# Patient Record
Sex: FEMALE | Race: WHITE
[De-identification: names, ages, dates, MRNs, and addresses within clinical notes are randomized per-mention and may not be internally consistent; named-entity substitution may affect disease eponyms.]

---

## 2019-01-03 ENCOUNTER — HOSPITAL ENCOUNTER (EMERGENCY)
Dept: HOSPITAL 25 - ED | Age: 39
LOS: 1 days | Discharge: HOME | End: 2019-01-04
Payer: MEDICAID

## 2019-01-03 DIAGNOSIS — F17.200: ICD-10-CM

## 2019-01-03 DIAGNOSIS — B34.9: Primary | ICD-10-CM

## 2019-01-03 DIAGNOSIS — R91.8: ICD-10-CM

## 2019-01-03 DIAGNOSIS — R59.0: ICD-10-CM

## 2019-01-03 LAB
HCT VFR BLD AUTO: 42 % (ref 35–47)
HGB BLD-MCNC: 14 G/DL (ref 12–16)
MCH RBC QN AUTO: 33 PG (ref 27–31)
MCHC RBC AUTO-ENTMCNC: 34 G/DL (ref 31–36)
MCV RBC AUTO: 98 FL (ref 80–97)
PLATELET # BLD AUTO: 347 10^3/UL (ref 150–450)
RBC # BLD AUTO: 4.23 10^6/UL (ref 4–5.4)
WBC # BLD AUTO: 11 10^3/UL (ref 3.5–10.8)

## 2019-01-03 PROCEDURE — 86140 C-REACTIVE PROTEIN: CPT

## 2019-01-03 PROCEDURE — 80053 COMPREHEN METABOLIC PANEL: CPT

## 2019-01-03 PROCEDURE — 85025 COMPLETE CBC W/AUTO DIFF WBC: CPT

## 2019-01-03 PROCEDURE — 83605 ASSAY OF LACTIC ACID: CPT

## 2019-01-03 PROCEDURE — 36415 COLL VENOUS BLD VENIPUNCTURE: CPT

## 2019-01-03 PROCEDURE — 71260 CT THORAX DX C+: CPT

## 2019-01-03 PROCEDURE — 85060 BLOOD SMEAR INTERPRETATION: CPT

## 2019-01-03 PROCEDURE — 84702 CHORIONIC GONADOTROPIN TEST: CPT

## 2019-01-03 PROCEDURE — 99282 EMERGENCY DEPT VISIT SF MDM: CPT

## 2019-01-04 VITALS — DIASTOLIC BLOOD PRESSURE: 74 MMHG | SYSTOLIC BLOOD PRESSURE: 124 MMHG

## 2019-01-04 LAB
ALBUMIN SERPL BCG-MCNC: 4 G/DL (ref 3.2–5.2)
ALBUMIN/GLOB SERPL: 1.2 {RATIO} (ref 1–3)
ALP SERPL-CCNC: 57 U/L (ref 34–104)
ALT SERPL W P-5'-P-CCNC: 7 U/L (ref 7–52)
ANION GAP SERPL CALC-SCNC: 7 MMOL/L (ref 2–11)
AST SERPL-CCNC: 10 U/L (ref 13–39)
BASOPHILS # BLD AUTO: 0.1 10^3/UL (ref 0–0.2)
BASOPHILS # BLD: 0.2 10^3/UL (ref 0–0.2)
BUN SERPL-MCNC: 7 MG/DL (ref 6–24)
BUN/CREAT SERPL: 10.8 (ref 8–20)
CALCIUM SERPL-MCNC: 9.3 MG/DL (ref 8.6–10.3)
CHLORIDE SERPL-SCNC: 105 MMOL/L (ref 101–111)
EOSINOPHIL # BLD AUTO: 0 10^3/UL (ref 0–0.6)
GLOBULIN SER CALC-MCNC: 3.3 G/DL (ref 2–4)
GLUCOSE SERPL-MCNC: 109 MG/DL (ref 70–100)
HCG SERPL QL: < 0.6 MIU/ML
HCO3 SERPL-SCNC: 26 MMOL/L (ref 22–32)
LYMPHOCYTES # BLD AUTO: 1.1 10^3/UL (ref 1–4.8)
MONOCYTES # BLD AUTO: 0.5 10^3/UL (ref 0–0.8)
NEUTROPHILS # BLD AUTO: 9.2 10^3/UL (ref 1.5–7.7)
NEUTROPHILS # BLD: 9.6 10^3/UL (ref 1.5–7.7)
NRBC # BLD AUTO: 0 10^3/UL
POTASSIUM SERPL-SCNC: 4.1 MMOL/L (ref 3.5–5)
PROT SERPL-MCNC: 7.3 G/DL (ref 6.4–8.9)
SODIUM SERPL-SCNC: 138 MMOL/L (ref 135–145)
VARIANT LYMPHS # BLD MANUAL: 1 % (ref 0–6)

## 2019-01-04 NOTE — ED
Respiratory





- HPI Summary


HPI Summary: 





Patient with history of cough, low-grade fever, myalgia sent by urgent care to 

ED for further evaluation of concerning find on chest x-ray.  Patient states 

symptoms 3 days.  Denies HA, ear pain, nasal congestion, neck stiffness, sore 

throat, CP, SOB, N/V/D, abdominal pain, change in urine, change in BM.  Medical 

history is none.  Pack-a-day smoker.





- History of Current Complaint


Chief Complaint: EDUpperRespComplaint


Stated Complaint: COUGH/FEVER


Time Seen by Provider: 01/03/19 23:13


Hx Obtained From: Patient


Onset/Duration: Lasting Days


Timing: Constant


Initial Severity: Moderate


Current Severity: Moderate


Pain Intensity: 4


Character: Cough (Productive)


Sputum Amount: Small


Sputum Color: Yellow


Aggravating Factor(s): Nothing


Alleviating Factor(s): Nothing


Associated Signs and Symptoms: Fever





- Allergy/Home Medications


Allergies/Adverse Reactions: 


 Allergies











Allergy/AdvReac Type Severity Reaction Status Date / Time


 


No Known Allergies Allergy   Verified 01/03/19 21:01














PMH/Surg Hx/FS Hx/Imm Hx


Endocrine/Hematology History: 


   Denies: Hx Anticoagulant Therapy


Cardiovascular History: 


   Denies: Hx Cardiac Arrest


 History: 


   Denies: Hx Dialysis


Sensory History: 


   Denies: Hx Eye Prosthesis


Neurological History: 


   Denies: Hx Developmental Delay


Psychiatric History: 


   Denies: Hx Autism


Infectious Disease History: No


Infectious Disease History: 


   Denies: Traveled Outside the [place] in Last 30 Days





- Family History


Known Family History: Positive: Unknown





- Social History


Lives: With Family


Alcohol Use: Rare


Substance Use Type: Reports: None


Smoking Status (MU): Heavy Every Day Tobacco Smoker





Review of Systems


Positive: Fever


Eyes: Negative


ENT: Negative


Cardiovascular: Negative


Positive: Cough


Gastrointestinal: Negative


Genitourinary: Negative


Positive: Myalgia


Skin: Negative


Neurological: Negative


Psychological: Normal


All Other Systems Reviewed And Are Negative: Yes





Physical Exam


Triage Information Reviewed: Yes


Vital Signs On Initial Exam: 


 Initial Vitals











Temp Pulse Resp BP Pulse Ox


 


 100.5 F   112   20   114/71   99 


 


 01/03/19 20:56  01/03/19 20:56  01/03/19 20:56  01/03/19 20:56  01/03/19 20:56











Vital Signs Reviewed: Yes


Appearance: Positive: Well-Appearing


Skin: Positive: Warm


Head/Face: Positive: Normal Head/Face Inspection


Eyes: Positive: Normal


ENT: Positive: Normal ENT inspection


Neck: Positive: Supple


Respiratory/Lung Sounds: Positive: Clear to Auscultation


Cardiovascular: Positive: Normal


Pelvic Exam: Positive: External Exam Normal


Musculoskeletal: Positive: Normal


Neurological: Positive: Normal


Psychiatric: Positive: Normal


AVPU Assessment: Alert





- [place] Coma Scale


Best Eye Response: 4 - Spontaneous


Best Motor Response: 6 - Obeys Commands


Best Verbal Response: 5 - Oriented


Coma Scale Total: 15





Diagnostics





- Vital Signs


 Vital Signs











  Temp Pulse Resp BP Pulse Ox


 


 01/03/19 23:22   94    98


 


 01/03/19 23:20   106   115/79  98


 


 01/03/19 20:56  100.5 F  112  20  114/71  99














- Laboratory


Lab Results: 


 Lab Results











  01/03/19 01/03/19 01/03/19 Range/Units





  23:40 23:40 23:42 


 


WBC    11.0 H  (3.5-10.8)  10^3/ul


 


RBC    4.23  (4.00-5.40)  10^6/ul


 


Hgb    14.0  (12.0-16.0)  g/dl


 


Hct    42  (35-47)  %


 


MCV    98 H  (80-97)  fL


 


MCH    33 H  (27-31)  pg


 


MCHC    34  (31-36)  g/dl


 


RDW    14  (10.5-15)  %


 


Plt Count    347  (150-450)  10^3/ul


 


MPV    7.2 L  (7.4-10.4)  fL


 


Neut % (Auto)    Not Reportable  


 


Lymph % (Auto)    Not Reportable  


 


Mono % (Auto)    Not Reportable  


 


Eos % (Auto)    Not Reportable  


 


Baso % (Auto)    Not Reportable  


 


Absolute Neuts (auto)    9.2 H  (1.5-7.7)  10^3/ul


 


Absolute Lymphs (auto)    1.1  (1.0-4.8)  10^3/ul


 


Absolute Monos (auto)    0.5  (0-0.8)  10^3/ul


 


Absolute Eos (auto)    0  (0-0.6)  10^3/ul


 


Absolute Basos (auto)    0.1  (0-0.2)  10^3/ul


 


Absolute Nucleated RBC    0  10^3/ul


 


Immature Gran %    1  (0-9)  %


 


Neutrophils %    86  %


 


Band Neutrophils %    1  (0-8)  %


 


Lymphocytes %    7  %


 


Reactive Lymphs %    1  (0-6)  %


 


Monocytes %    3  %


 


Basophils %    2  %


 


Nucleated RBC %    Not Reportable  


 


Abs Neuts (Manual)    9.6 H  (1.5-7.7)  10^3/ul


 


Abs Lymphs (Manual)    0.9 L  (1.0-4.8)  10^3/ul


 


Abs Monocytes (Manual)    0.3  (0-0.8)  10^3/ul


 


Abs Basophils (Manual)    0.2  (0-0.2)  10^3/ul


 


Normal RBC Morphology    Not Reportable  


 


Macrocytosis    2+  


 


Hem Pathologist Commnt    Pending  


 


Sodium  138    (135-145)  mmol/L


 


Potassium  4.1    (3.5-5.0)  mmol/L


 


Chloride  105    (101-111)  mmol/L


 


Carbon Dioxide  26    (22-32)  mmol/L


 


Anion Gap  7    (2-11)  mmol/L


 


BUN  7    (6-24)  mg/dL


 


Creatinine  0.65    (0.51-0.95)  mg/dL


 


Est GFR ( Amer)  122.8    (>60)  


 


Est GFR (Non-Af Amer)  101.5    (>60)  


 


BUN/Creatinine Ratio  10.8    (8-20)  


 


Glucose  109 H    ()  mg/dL


 


Lactic Acid   0.5   (0.5-2.0)  mmol/L


 


Calcium  9.3    (8.6-10.3)  mg/dL


 


Total Bilirubin  0.50    (0.2-1.0)  mg/dL


 


AST  10 L    (13-39)  U/L


 


ALT  7    (7-52)  U/L


 


Alkaline Phosphatase  57    ()  U/L


 


C-Reactive Protein  16.72 H    (<8.01)  mg/L


 


Total Protein  7.3    (6.4-8.9)  g/dL


 


Albumin  4.0    (3.2-5.2)  g/dL


 


Globulin  3.3    (2-4)  g/dL


 


Albumin/Globulin Ratio  1.2    (1-3)  


 


Beta HCG, Quant  < 0.60    mIU/mL


 


Influenza A (Rapid)     (Negative)  


 


Influenza B (Rapid)     (Negative)  














  01/03/19 Range/Units





  23:43 


 


WBC   (3.5-10.8)  10^3/ul


 


RBC   (4.00-5.40)  10^6/ul


 


Hgb   (12.0-16.0)  g/dl


 


Hct   (35-47)  %


 


MCV   (80-97)  fL


 


MCH   (27-31)  pg


 


MCHC   (31-36)  g/dl


 


RDW   (10.5-15)  %


 


Plt Count   (150-450)  10^3/ul


 


MPV   (7.4-10.4)  fL


 


Neut % (Auto)   


 


Lymph % (Auto)   


 


Mono % (Auto)   


 


Eos % (Auto)   


 


Baso % (Auto)   


 


Absolute Neuts (auto)   (1.5-7.7)  10^3/ul


 


Absolute Lymphs (auto)   (1.0-4.8)  10^3/ul


 


Absolute Monos (auto)   (0-0.8)  10^3/ul


 


Absolute Eos (auto)   (0-0.6)  10^3/ul


 


Absolute Basos (auto)   (0-0.2)  10^3/ul


 


Absolute Nucleated RBC   10^3/ul


 


Immature Gran %   (0-9)  %


 


Neutrophils %   %


 


Band Neutrophils %   (0-8)  %


 


Lymphocytes %   %


 


Reactive Lymphs %   (0-6)  %


 


Monocytes %   %


 


Basophils %   %


 


Nucleated RBC %   


 


Abs Neuts (Manual)   (1.5-7.7)  10^3/ul


 


Abs Lymphs (Manual)   (1.0-4.8)  10^3/ul


 


Abs Monocytes (Manual)   (0-0.8)  10^3/ul


 


Abs Basophils (Manual)   (0-0.2)  10^3/ul


 


Normal RBC Morphology   


 


Macrocytosis   


 


Hem Pathologist Commnt   


 


Sodium   (135-145)  mmol/L


 


Potassium   (3.5-5.0)  mmol/L


 


Chloride   (101-111)  mmol/L


 


Carbon Dioxide   (22-32)  mmol/L


 


Anion Gap   (2-11)  mmol/L


 


BUN   (6-24)  mg/dL


 


Creatinine   (0.51-0.95)  mg/dL


 


Est GFR ( Amer)   (>60)  


 


Est GFR (Non-Af Amer)   (>60)  


 


BUN/Creatinine Ratio   (8-20)  


 


Glucose   ()  mg/dL


 


Lactic Acid   (0.5-2.0)  mmol/L


 


Calcium   (8.6-10.3)  mg/dL


 


Total Bilirubin   (0.2-1.0)  mg/dL


 


AST   (13-39)  U/L


 


ALT   (7-52)  U/L


 


Alkaline Phosphatase   ()  U/L


 


C-Reactive Protein   (<8.01)  mg/L


 


Total Protein   (6.4-8.9)  g/dL


 


Albumin   (3.2-5.2)  g/dL


 


Globulin   (2-4)  g/dL


 


Albumin/Globulin Ratio   (1-3)  


 


Beta HCG, Quant   mIU/mL


 


Influenza A (Rapid)  Negative  (Negative)  


 


Influenza B (Rapid)  Negative  (Negative)  











Result Diagrams: 


 01/03/19 23:42





 01/03/19 23:40


Lab Statement: Any lab studies that have been ordered have been reviewed, and 

results considered in the medical decision making process.





- CT


  ** chest


CT Interpretation Completed By: Radiologist - 4.3 x 4.7 pulmonary Mass in right 

lower lobe with enlarged subcarinal and right hilar lymph nodes with necrotic 

center, concerning for malignancy





Disposition





- Course


Course Of Treatment: Patient with history of cough, low-grade fever, myalgia 

sent by urgent care to ED for further evaluation of concerning find on chest x-

ray.  Patient states symptoms 3 days.  Denies HA, ear pain, nasal congestion, 

neck stiffness, sore throat, CP, SOB, N/V/D, abdominal pain, change in urine, 

change in BM.  Medical history is none.  Pack-a-day smoker.  Physical exam 

unremarkable.  Initial temperature 100.5, heart rate 112.  Vital signs since 

within normal limits.  WBC 11.0.  Labs otherwise unremarkable.  Influenza 

negative.  CT chest positive for mass concerning for malignancy.  Diagnosis 

viral syndrome.  Advised patient follow up with oncology for biopsy of lung 

mass.  Patient understands and approves of plan.





- Diagnoses


Provider Diagnoses: 


 Viral syndrome, Mass of lung








Discharge





- Sign-Out/Discharge


Documenting (check all that apply): Patient Departure





- Discharge Plan


Condition: Stable


Disposition: HOME


Patient Education Materials:  Needle Biopsy of the Lung (ED), Viral Syndrome (ED

)


Referrals: 


No Primary Care Phys,NOPCP [Primary Care Provider] - 


Kavita Pugh MD [Medical Doctor] - 


Additional Instructions: 


For viral syndrome drink plenty of fluids to help maintain hydration.  

Alternate ibuprofen 600 mg with Tylenol 650 mg every 3 hours for body aches and 

fever control.  For biopsy of lung mass follow up with oncology Dr. Pugh 

for further evaluation.  Return to the ED for any new or worsening symptoms.





- Billing Disposition and Condition


Condition: STABLE


Disposition: Home

## 2019-01-09 ENCOUNTER — HOSPITAL ENCOUNTER (OUTPATIENT)
Dept: HOSPITAL 25 - OR | Age: 39
Discharge: HOME | End: 2019-01-09
Attending: INTERNAL MEDICINE
Payer: COMMERCIAL

## 2019-01-09 VITALS — DIASTOLIC BLOOD PRESSURE: 80 MMHG | SYSTOLIC BLOOD PRESSURE: 117 MMHG

## 2019-01-09 DIAGNOSIS — R00.2: ICD-10-CM

## 2019-01-09 DIAGNOSIS — F17.210: ICD-10-CM

## 2019-01-09 DIAGNOSIS — C34.90: ICD-10-CM

## 2019-01-09 DIAGNOSIS — C77.1: Primary | ICD-10-CM

## 2019-01-09 DIAGNOSIS — Z88.0: ICD-10-CM

## 2019-01-09 PROCEDURE — 88342 IMHCHEM/IMCYTCHM 1ST ANTB: CPT

## 2019-01-09 PROCEDURE — 88173 CYTOPATH EVAL FNA REPORT: CPT

## 2019-01-09 PROCEDURE — 88341 IMHCHEM/IMCYTCHM EA ADD ANTB: CPT

## 2019-01-09 PROCEDURE — 88172 CYTP DX EVAL FNA 1ST EA SITE: CPT

## 2019-01-09 PROCEDURE — 88305 TISSUE EXAM BY PATHOLOGIST: CPT

## 2019-01-09 PROCEDURE — 81445 SO NEO GSAP 5-50DNA/DNA&RNA: CPT

## 2019-01-09 PROCEDURE — 88360 TUMOR IMMUNOHISTOCHEM/MANUAL: CPT

## 2019-01-09 PROCEDURE — 88177 CYTP FNA EVAL EA ADDL: CPT

## 2019-01-09 NOTE — PRO
BRONCHOSCOPY REPORT:

 

DATE OF PROCEDURE:  01/09/19 - [facility]

 

PROCEDURE PERFORMED:  Bronchoscopy with endobronchial ultrasound-guided fine 
needle aspiration from station 7 and L4 lymph node.

 

PREPROCEDURAL DIAGNOSIS:  Lung mass, mediastinal adenopathy.

 

POSTPROCEDURAL DIAGNOSIS:  Lung cancer.

 

ANESTHESIA:  General anesthesia.

 

ANESTHESIOLOGIST:  Dr. Pozo.

 

DESCRIPTION OF PROCEDURE:  Informed consent was obtained from the patient prior 
to the procedure after all the risks and benefits were thoroughly explained.  
The patient recently was being evaluated for cough, was found to have large 
lung mass. Appropriate time-out was agreed on by attending staff prior to the 
procedure.  The patient was lying supine on operating room table.  Flexible 
bronchoscope was then inserted through ET tube, ET tube positioning was 
confirmed to be 2 cm above the level of kathryn.  Bronchoscope was then advanced 
into the right bronchial tree, which was then inspected.  Thick clear 
secretions were noted copious amounts on both sides and were suctioned out.  No 
endobronchial lesions were noted. Bronchoscope was then advanced to left 
bronchial tree, which was inspected.  No endobronchial lesions were noted.  
Thin secretions were noted and were suctioned. All airways were open and 
patent.  Bronchoscope was then withdrawn and EBUS bronchoscope was inserted.  
Station 7 lymph node appeared to be significantly enlarged.  Station 7 was then 
sampled with 6 passes.  Rapid on-site evaluation revealed lymphatic tissue and 
malignant cells. Rest of specimen was placed in formalin for cell block.  
Bronchoscope was then advanced into the left bronchial tree and lymph nodes 
were inspected.  No significantly enlarged lymph nodes were noted.  L4 was 
minimally enlarged and measured about 0.4 cm and which was accessed with 2 
passes.  Rapid on-site evaluation revealed lymphatic tissue with no malignant 
cells.  Rest of the specimen was placed in formalin.  Bronchoscope was then 
withdrawn and regular bronchoscope was inserted for any bleeding.  No 
significant bleeding noted and secretions were suctioned and bronchoscope was 
withdrawn.  The patient was extubated and seen in Recovery in optimal condition.

 

 [id number]/[facility] #: [id number]

[facility]

## 2019-01-23 ENCOUNTER — HOSPITAL ENCOUNTER (OUTPATIENT)
Dept: HOSPITAL 25 - OR | Age: 39
Discharge: HOME | End: 2019-01-23
Attending: SURGERY
Payer: COMMERCIAL

## 2019-01-23 VITALS — SYSTOLIC BLOOD PRESSURE: 115 MMHG | DIASTOLIC BLOOD PRESSURE: 81 MMHG

## 2019-01-23 DIAGNOSIS — C34.31: Primary | ICD-10-CM

## 2019-01-23 DIAGNOSIS — Z87.891: ICD-10-CM

## 2019-01-23 DIAGNOSIS — F41.9: ICD-10-CM

## 2019-01-23 DIAGNOSIS — R00.2: ICD-10-CM

## 2019-01-23 PROCEDURE — 71045 X-RAY EXAM CHEST 1 VIEW: CPT

## 2019-01-23 PROCEDURE — C1788 PORT, INDWELLING, IMP: HCPCS

## 2019-01-23 PROCEDURE — 76000 FLUOROSCOPY <1 HR PHYS/QHP: CPT

## 2019-01-23 NOTE — BRIEFOPN
Brief Operative Note





- Surgery


Procedures: 


PRE/POSTOP DX: LUNG CA


PROC: POWERPORT PLACEMENT


SURG: MECENAS


ASSIST: NONE


ANES: LOCAL/MAC; ALEXANDRA


SPEC: NONE


DRAIN/COMPL: NONE


COND: STABLE TO RR


FIND: LEFT SUBCLAVIAN PORT PLACED WITHOUT DIFFICULTY.

## 2019-02-12 NOTE — OP
CC:  [facility] *

 

DATE OF OPERATION:  01/23/19 - SDS

 

DATE OF BIRTH:  [birth year]

 

SURGEON:  Onofre Bruno MD

 

ASSISTANT:  None.

 

ANESTHESIOLOGIST:  Dr. Thurman.

 

ANESTHESIA:  Local MAC.

 

PRE-OP DIAGNOSES:  Lung cancer.

 

POST-OP DIAGNOSES:  Lung cancer.

 

OPERATIVE PROCEDURE:  PowerPort placement.

 

ESTIMATED BLOOD LOSS:  Minimal.

 

IV FLUIDS:  Crystalloid.

 

SPECIMENS:  None.

 

DRAINS:  None.

 

COMPLICATIONS:  None.

 

COUNTS:  The instrument, needle, and sponge counts were correct.

 

DESCRIPTION OF PROCEDURE:  The patient was brought to the operating room and 
placed on the table supine.  Sequential compression devices were placed in both 
lower extremities.  The patient was administered intravenous sedation.  Her 
chest and neck were prepped and draped in the usual sterile fashion and time-
out was performed.

 

Local anesthetic was infiltrated into the left chest using a left subclavian 
approach.  After accessing the left subclavian vein without difficulty, a 
guidewire was placed into the superior vena cava confirmed under fluoroscopic 
guidance. Needle was removed and then additional local anesthetic was 
infiltrated into the skin and soft tissue and a pocket was created for the 
PowerPort in the left upper chest.  The 8-Tajik PowerPort was then back 
tunneled to the pocket having made a counter incision at the guidewire 
insertion site.  The peel-away sheath and dilator were then advanced under 
fluoroscopic guidance over the guidewire into the superior vena cava.  The 
guidewire and dilator were removed.  The catheter was then advanced into the 
superior vena cava with the tip position at the atriocaval junction under 
fluoroscopic guidance.  The catheter was cut to an appropriate length and 
connected this to the PowerPort, which was then placed into the pocket and 
secured with a single suture of 2-0 Prolene.  The port was accessed, it chela 
and flushed easily. The pocket was closed in two layers with 3-0 Vicryl for the 
subcutaneous tissue, 4-0 Monocryl for the skin.  The port was flushed with 
heparinized saline.  Tegaderm dressing was applied.  The patient tolerated the 
procedure well, was awakened and transferred to the recovery room in stable 
condition.

 

 [id number]/[facility] #: [id number]

[facility]